# Patient Record
Sex: MALE | Race: WHITE | NOT HISPANIC OR LATINO | Employment: FULL TIME | URBAN - METROPOLITAN AREA
[De-identification: names, ages, dates, MRNs, and addresses within clinical notes are randomized per-mention and may not be internally consistent; named-entity substitution may affect disease eponyms.]

---

## 2024-01-05 ENCOUNTER — ANCILLARY PROCEDURE (OUTPATIENT)
Dept: RADIOLOGY | Facility: CLINIC | Age: 54
End: 2024-01-05
Payer: COMMERCIAL

## 2024-01-05 DIAGNOSIS — Z13.6 ENCOUNTER FOR SCREENING FOR CARDIOVASCULAR DISORDERS: ICD-10-CM

## 2024-01-05 PROCEDURE — 75571 CT HRT W/O DYE W/CA TEST: CPT

## 2024-12-11 ENCOUNTER — APPOINTMENT (OUTPATIENT)
Dept: PODIATRY | Facility: CLINIC | Age: 54
End: 2024-12-11
Payer: COMMERCIAL

## 2024-12-11 DIAGNOSIS — M76.62 ACHILLES TENDONITIS, BILATERAL: Primary | ICD-10-CM

## 2024-12-11 DIAGNOSIS — M79.672 LEFT FOOT PAIN: ICD-10-CM

## 2024-12-11 DIAGNOSIS — M76.62 ACHILLES TENDINITIS, LEFT LEG: ICD-10-CM

## 2024-12-11 DIAGNOSIS — M76.61 ACHILLES TENDONITIS, BILATERAL: Primary | ICD-10-CM

## 2024-12-11 DIAGNOSIS — M79.671 RIGHT FOOT PAIN: ICD-10-CM

## 2024-12-11 DIAGNOSIS — M76.61 TENDONITIS, ACHILLES, RIGHT: ICD-10-CM

## 2024-12-11 PROCEDURE — L3020 FOOT LONGITUD/METATARSAL SUP: HCPCS | Performed by: PODIATRIST

## 2024-12-11 PROCEDURE — 99212 OFFICE O/P EST SF 10 MIN: CPT | Performed by: PODIATRIST

## 2024-12-11 PROCEDURE — 1036F TOBACCO NON-USER: CPT | Performed by: PODIATRIST

## 2024-12-11 NOTE — PROGRESS NOTES
CC: heel pain.     HPI:  Patient seen for chronic achilles tendonitis, doing well, orthotics have worked well in the past. No acute injury seen with wife.    PCP: Dr. Seo  Last visit: 1-15-24     PMH  Past Medical History:   Diagnosis Date    Personal history of other diseases of the digestive system     History of gastroesophageal reflux (GERD)    Personal history of other diseases of the digestive system     History of hiatal hernia    Personal history of other drug therapy     COVID-19 vaccine series completed    Personal history of other endocrine, nutritional and metabolic disease     History of high cholesterol    Personal history of other specified conditions     History of heartburn    Shortness of breath     SOBOE (shortness of breath on exertion)     MEDS  No current outpatient medications on file.  Allergies  Not on File  Social History     Socioeconomic History    Marital status:    Tobacco Use    Smoking status: Never    Smokeless tobacco: Never     No family history on file.  Past Surgical History:   Procedure Laterality Date    OTHER SURGICAL HISTORY  07/20/2022    Hernia repair       REVIEW OF SYSTEMS    Musculoskeletal: + as noted in HPI.       Physical examination:   On General Observation: Patient is a pleasant, cooperative, well developed 54 y.o.  adult male. The patient is alert and oriented to time, place and person. Patient has normal affect and mood.  There were no vitals taken for this visit.    Vascular:  DP and PT pulses are 2/4 b/l.  Mild edema to noted to  heel.  CFT 5 sec 1-5 b/l le    Muscular: Strength is 5/5 for all instrinsic and extrinsic muscle groups with exception of ankle dorsiflexion and plantarflexion d/t gaurding.  Pain with palpation insertion site of the achilles tendon.  Positive erythema and effusion to the plantar  heel.  No pain with palpation/compression to posterior calcaneal tuber.  No signs of calcaneal stress fracture.        Neuro:   Light touch present  bilateral.     Derm:   Skin texture and turgor within normal limits.     No rashes, subcutaneous nodules, or open lesions noted.  No hyperkeratotic tissue. No erythema      ASSESSMENT:    Achilles tendonitis  Pain feet       PLAN:   Exam  A comprehensive history and physical examination were preformed. The patient was educated on clinical findings, diagnosis and treatment plans. Patient understands all that has been explained and all questions were answered to apparent satisfaction.     - We discussed the etiology of the heel complaints as well as the achilles tendinitis treatment protocol.  - Pt to to begin stretching, icing, and wearing walking boot/appropriate shoes .   -Advised patient on use of anti-inflammatory medications .   - Patient given instructions to start PT.    - Coasted for custom orthotics biofoam sporrt, will check benefits, abn signed will be self pay if not covered.         Carlos Gray DPM

## 2024-12-26 ENCOUNTER — TELEPHONE (OUTPATIENT)
Dept: PRIMARY CARE | Facility: CLINIC | Age: 54
End: 2024-12-26
Payer: COMMERCIAL

## 2024-12-26 NOTE — TELEPHONE ENCOUNTER
----- Message from Carlos Gray sent at 12/11/2024 12:52 PM EST -----  Regarding: orthotics  Please check orthotic dx is achilles tendonitis, he signed abn wants them either way thanks

## 2024-12-26 NOTE — TELEPHONE ENCOUNTER
[facility] [place]  2024 BENEFITS (PATIENT ALREADY MOLDED FOR ORTHOTICS ON 12/11/24)  [phone]  PER: ISI ZEPEDA  EFFECTIVE 1/1/23 TO CURRENT (ORIGINAL DATE 1/1/15)  [facility] IS IN NETWORK  CPT  X2  DX M76.61 AND M76.62 ARE VALID AND BILLABLE, BASED ON MEDICAL NECESSITY  PLAN PAYS AT 80% OF THE ALLOWED AMT, ONCE DEDUCTIBLE OF $5000 HAS BEEN MET. ALL OF WHICH HAS BEEN SATISFIED. $5500 OUT OF POCKET EXPENSE OF WHICH $247.04 HAS BEEN SATISFIED.  NO EXCLUSION TO PLAN  NO PRIOR AUTHORIZATION IS NEEDED IF UNDER $1000.  REFERENCE #[id number]    CALLED PATIENT AND GAVE HIM THE ABOVE INFO. PATIENT WAS ALREADY MOLDED ON 12/11/24. THANK YOU!

## 2025-01-02 ENCOUNTER — TELEPHONE (OUTPATIENT)
Dept: PRIMARY CARE | Facility: CLINIC | Age: 55
End: 2025-01-02
Payer: COMMERCIAL

## 2025-01-28 ENCOUNTER — TELEPHONE (OUTPATIENT)
Dept: PRIMARY CARE | Facility: CLINIC | Age: 55
End: 2025-01-28
Payer: COMMERCIAL

## 2025-01-28 NOTE — TELEPHONE ENCOUNTER
Dr. Thomas at bedside to discuss results of test with patient and family.   Your orthotics are in.  Please schedule an appointment at your earliest convenience with Dr Gray to get your new inserts.  You can call [phone] or schedule through [employer].   Thank you.

## 2025-01-31 ENCOUNTER — OFFICE VISIT (OUTPATIENT)
Dept: PODIATRY | Facility: CLINIC | Age: 55
End: 2025-01-31
Payer: COMMERCIAL

## 2025-01-31 DIAGNOSIS — M76.62 ACHILLES TENDONITIS, BILATERAL: Primary | ICD-10-CM

## 2025-01-31 DIAGNOSIS — M76.61 ACHILLES TENDONITIS, BILATERAL: Primary | ICD-10-CM

## 2025-01-31 PROCEDURE — 99212 OFFICE O/P EST SF 10 MIN: CPT | Performed by: PODIATRIST

## 2025-01-31 PROCEDURE — 1036F TOBACCO NON-USER: CPT | Performed by: PODIATRIST

## 2025-01-31 NOTE — PROGRESS NOTES
CC: heel pain.      HPI:  Patient seen to  orthotics.     PCP: Dr. Seo  Last visit: 1-15-24      PMH  Medical History        Past Medical History:   Diagnosis Date    Personal history of other diseases of the digestive system       History of gastroesophageal reflux (GERD)    Personal history of other diseases of the digestive system       History of hiatal hernia    Personal history of other drug therapy       COVID-19 vaccine series completed    Personal history of other endocrine, nutritional and metabolic disease       History of high cholesterol    Personal history of other specified conditions       History of heartburn    Shortness of breath       SOBOE (shortness of breath on exertion)         MEDS  Current Medications   No current outpatient medications on file.     Allergies  Allergies   Not on File     Social History   Social History           Socioeconomic History    Marital status:    Tobacco Use    Smoking status: Never    Smokeless tobacco: Never         Family History   No family history on file.     Surgical History         Past Surgical History:   Procedure Laterality Date    OTHER SURGICAL HISTORY   07/20/2022     Hernia repair            REVIEW OF SYSTEMS     Musculoskeletal: + as noted in HPI.         Physical examination:   On General Observation: Patient is a pleasant, cooperative, well developed 54 y.o.  adult male. The patient is alert and oriented to time, place and person. Patient has normal affect and mood.  There were no vitals taken for this visit.     Vascular:  DP and PT pulses are 2/4 b/l.  Mild edema to noted to  heel.  CFT 5 sec 1-5 b/l le     Muscular: Strength is 5/5 for all instrinsic and extrinsic muscle groups with exception of ankle dorsiflexion and plantarflexion d/t gaurding.  Pain with palpation insertion site of the achilles tendon.  Positive erythema and effusion to the plantar  heel.  No pain with palpation/compression to posterior calcaneal tuber.  No  signs of calcaneal stress fracture.          Neuro:   Light touch present bilateral.      Derm:   Skin texture and turgor within normal limits.     No rashes, subcutaneous nodules, or open lesions noted.  No hyperkeratotic tissue. No erythema        ASSESSMENT:    Achilles tendonitis  Pain feet         PLAN:   Dispensed orthotics with oral and written instructions, follow up 4 weeks if any issues.           Carlos Gray DPM

## 2025-07-26 ENCOUNTER — PATIENT MESSAGE (OUTPATIENT)
Dept: PODIATRY | Facility: CLINIC | Age: 55
End: 2025-07-26
Payer: COMMERCIAL

## 2025-07-26 DIAGNOSIS — M79.674 TOE PAIN, RIGHT: Primary | ICD-10-CM

## 2025-08-29 ENCOUNTER — TELEPHONE (OUTPATIENT)
Dept: PRIMARY CARE | Facility: CLINIC | Age: 55
End: 2025-08-29
Payer: COMMERCIAL